# Patient Record
Sex: FEMALE | Race: ASIAN | NOT HISPANIC OR LATINO
[De-identification: names, ages, dates, MRNs, and addresses within clinical notes are randomized per-mention and may not be internally consistent; named-entity substitution may affect disease eponyms.]

---

## 2017-04-03 ENCOUNTER — APPOINTMENT (OUTPATIENT)
Dept: OBGYN | Facility: CLINIC | Age: 52
End: 2017-04-03

## 2017-04-03 VITALS
SYSTOLIC BLOOD PRESSURE: 140 MMHG | DIASTOLIC BLOOD PRESSURE: 74 MMHG | BODY MASS INDEX: 31.41 KG/M2 | HEIGHT: 60 IN | WEIGHT: 160 LBS

## 2017-04-03 DIAGNOSIS — R00.2 PALPITATIONS: ICD-10-CM

## 2017-04-03 RX ORDER — METOPROLOL TARTRATE 25 MG/1
25 TABLET, FILM COATED ORAL
Refills: 0 | Status: ACTIVE | COMMUNITY

## 2017-04-07 LAB
C TRACH RRNA SPEC QL NAA+PROBE: NORMAL
N GONORRHOEA RRNA SPEC QL NAA+PROBE: NORMAL
SOURCE AMPLIFICATION: NORMAL

## 2018-04-16 ENCOUNTER — APPOINTMENT (OUTPATIENT)
Dept: OBGYN | Facility: CLINIC | Age: 53
End: 2018-04-16
Payer: COMMERCIAL

## 2018-04-16 VITALS
DIASTOLIC BLOOD PRESSURE: 70 MMHG | WEIGHT: 162 LBS | SYSTOLIC BLOOD PRESSURE: 130 MMHG | BODY MASS INDEX: 31.8 KG/M2 | HEIGHT: 60 IN

## 2018-04-16 DIAGNOSIS — L90.0 LICHEN SCLEROSUS ET ATROPHICUS: ICD-10-CM

## 2018-04-16 DIAGNOSIS — Z00.00 ENCOUNTER FOR GENERAL ADULT MEDICAL EXAMINATION W/OUT ABNORMAL FINDINGS: ICD-10-CM

## 2018-04-16 PROCEDURE — 99396 PREV VISIT EST AGE 40-64: CPT

## 2018-04-18 LAB — HPV HIGH+LOW RISK DNA PNL CVX: NOT DETECTED

## 2018-04-21 LAB — CYTOLOGY CVX/VAG DOC THIN PREP: NORMAL

## 2019-04-21 PROBLEM — Z12.11 COLON CANCER SCREENING: Status: ACTIVE | Noted: 2017-04-03

## 2019-04-22 ENCOUNTER — APPOINTMENT (OUTPATIENT)
Dept: OBGYN | Facility: CLINIC | Age: 54
End: 2019-04-22
Payer: COMMERCIAL

## 2019-04-22 DIAGNOSIS — Z12.11 ENCOUNTER FOR SCREENING FOR MALIGNANT NEOPLASM OF COLON: ICD-10-CM

## 2019-04-26 ENCOUNTER — APPOINTMENT (OUTPATIENT)
Dept: OBGYN | Facility: CLINIC | Age: 54
End: 2019-04-26
Payer: COMMERCIAL

## 2019-04-26 VITALS
WEIGHT: 160 LBS | SYSTOLIC BLOOD PRESSURE: 160 MMHG | HEIGHT: 60 IN | DIASTOLIC BLOOD PRESSURE: 100 MMHG | BODY MASS INDEX: 31.41 KG/M2

## 2019-04-26 DIAGNOSIS — D25.9 LEIOMYOMA OF UTERUS, UNSPECIFIED: ICD-10-CM

## 2019-04-26 DIAGNOSIS — Z01.411 ENCOUNTER FOR GYNECOLOGICAL EXAMINATION (GENERAL) (ROUTINE) WITH ABNORMAL FINDINGS: ICD-10-CM

## 2019-04-26 PROCEDURE — 99396 PREV VISIT EST AGE 40-64: CPT

## 2019-04-26 NOTE — HISTORY OF PRESENT ILLNESS
[de-identified] : April 2018 [Continuous] : no continuous [Dull] : no dull [Sharp] : no sharp [Throbbing] : no throbbing [Stabbing] : no stabbing [Burning] : no burning [Mass] : no mass [Itching] : no itching [Lesion] : no lesion [Stinging] : no stinging [Soreness] : no soreness [Discharge] : no discharge [FreeTextEntry9] : Patient denies a manner [Localized Pain] : no localized pain [Mass (___cm)] : no palpable mass [Diffused Pain] : no diffused pain [Nipple Discharge] : no nipple discharge [Skin Color Change] : no skin color change [Hot Flashes] : no hot flashes [Night Sweats] : no night sweats [Vaginal Itching] : no vaginal itching [Dyspareunia] : no dyspareunia [FreeTextEntry8] : No menopausal symptoms reported [Mood Changes] : no mood changes [Spotting Between  Menses] : no spotting between menses [Menstrual Cramps] : no menstrual cramps [Currently In Menopause] : not currently in menopause [Sexually Active] : is not sexually active [Experiencing Menopausal Sxs] : not experiencing menopausal symptoms [Contraception] : does not use contraception [de-identified] : current partner since 1995 - satish active anymore

## 2019-04-26 NOTE — PHYSICAL EXAM
[Awake] : awake [Alert] : alert [Soft] : soft [Oriented x3] : oriented to person, place, and time [Labia Majora] : labia major [Labia Minora] : labia minora [No Bleeding] : there was no active vaginal bleeding [Normal] : clitoris [Enlarged ___ wks] : enlarged [unfilled] ~Uweeks [Anteversion] : anteverted [Uterine Adnexae] : were not tender and not enlarged [No Tenderness] : no rectal tenderness [Acute Distress] : no acute distress [Goiter] : no goiter [Thyroid Nodule] : no thyroid nodule [LAD] : no lymphadenopathy [Mass] : no breast mass [Axillary LAD] : no axillary lymphadenopathy [Nipple Discharge] : no nipple discharge [Tender] : non tender [Distended] : not distended [Flat Affect] : affect not flat [H/Smegaly] : no hepatosplenomegaly [Depressed Mood] : not depressed [Discharge] : had no discharge [IUD String] : did not have an IUD string protruding out [Pap Obtained] : a Pap smear was not performed [Tenderness] : nontender [Mass ___ cm] : no uterine mass was palpated [Motion Tenderness] : there was no cervical motion tenderness [de-identified] : Bilateral erythema of the labia majora - parchment like texture of the skin - consistent with lichen [FreeTextEntry5] : Ectropion [FreeTextEntry7] : Several fibroid nodules measuring approximately 3 cm palpable

## 2019-04-26 NOTE — CHIEF COMPLAINT
[FreeTextEntry1] : \par \par This patient was last seen in April 2018\par Issues discussed\par \par \par Annual examination\par Pap smear and HPV negative April 2018\par Gonorrhea and Chlamydia negative March 2016 -  she is not sexually active\par Mammogram and breast ultrasound BI-RADS category 2 April 2018\par Patient to make scheduled appointment for colonoscopy this year\par

## 2020-07-09 ENCOUNTER — APPOINTMENT (OUTPATIENT)
Dept: OBGYN | Facility: CLINIC | Age: 55
End: 2020-07-09
Payer: COMMERCIAL

## 2020-07-09 VITALS
BODY MASS INDEX: 31.61 KG/M2 | HEIGHT: 60 IN | SYSTOLIC BLOOD PRESSURE: 112 MMHG | DIASTOLIC BLOOD PRESSURE: 74 MMHG | WEIGHT: 161 LBS

## 2020-07-09 DIAGNOSIS — R39.11 HESITANCY OF MICTURITION: ICD-10-CM

## 2020-07-09 DIAGNOSIS — N95.1 MENOPAUSAL AND FEMALE CLIMACTERIC STATES: ICD-10-CM

## 2020-07-09 DIAGNOSIS — Z01.419 ENCOUNTER FOR GYNECOLOGICAL EXAMINATION (GENERAL) (ROUTINE) W/OUT ABNORMAL FINDINGS: ICD-10-CM

## 2020-07-09 PROCEDURE — 99396 PREV VISIT EST AGE 40-64: CPT

## 2020-07-09 NOTE — PHYSICAL EXAM
[Awake] : awake [Alert] : alert [Acute Distress] : no acute distress [Mass] : no breast mass [Axillary LAD] : no axillary lymphadenopathy [Nipple Discharge] : no nipple discharge [Soft] : soft [Tender] : non tender [Oriented x3] : oriented to person, place, and time [No Bleeding] : there was no active vaginal bleeding [Normal] : uterus [Uterine Adnexae] : were not tender and not enlarged [FreeTextEntry4] : stage 1 AVWP and apical prolapse, good posterior support

## 2021-08-02 ENCOUNTER — APPOINTMENT (OUTPATIENT)
Dept: OBGYN | Facility: CLINIC | Age: 56
End: 2021-08-02
Payer: COMMERCIAL

## 2021-08-02 ENCOUNTER — NON-APPOINTMENT (OUTPATIENT)
Age: 56
End: 2021-08-02

## 2021-08-02 VITALS
WEIGHT: 154.38 LBS | HEIGHT: 60 IN | SYSTOLIC BLOOD PRESSURE: 170 MMHG | DIASTOLIC BLOOD PRESSURE: 90 MMHG | BODY MASS INDEX: 30.31 KG/M2

## 2021-08-02 DIAGNOSIS — R92.2 INCONCLUSIVE MAMMOGRAM: ICD-10-CM

## 2021-08-02 DIAGNOSIS — Z01.419 ENCOUNTER FOR GYNECOLOGICAL EXAMINATION (GENERAL) (ROUTINE) W/OUT ABNORMAL FINDINGS: ICD-10-CM

## 2021-08-02 DIAGNOSIS — F41.9 ANXIETY DISORDER, UNSPECIFIED: ICD-10-CM

## 2021-08-02 PROCEDURE — 99396 PREV VISIT EST AGE 40-64: CPT

## 2021-08-03 LAB — HPV HIGH+LOW RISK DNA PNL CVX: NOT DETECTED

## 2021-08-06 LAB — CYTOLOGY CVX/VAG DOC THIN PREP: NORMAL

## 2022-08-22 ENCOUNTER — APPOINTMENT (OUTPATIENT)
Dept: OBGYN | Facility: CLINIC | Age: 57
End: 2022-08-22

## 2024-07-31 ENCOUNTER — APPOINTMENT (OUTPATIENT)
Dept: OBGYN | Facility: CLINIC | Age: 59
End: 2024-07-31
Payer: COMMERCIAL

## 2024-07-31 VITALS
SYSTOLIC BLOOD PRESSURE: 173 MMHG | HEIGHT: 62 IN | DIASTOLIC BLOOD PRESSURE: 93 MMHG | BODY MASS INDEX: 28.34 KG/M2 | WEIGHT: 154 LBS

## 2024-07-31 DIAGNOSIS — N81.10 CYSTOCELE, UNSPECIFIED: ICD-10-CM

## 2024-07-31 DIAGNOSIS — R10.2 PELVIC AND PERINEAL PAIN: ICD-10-CM

## 2024-07-31 PROCEDURE — 99213 OFFICE O/P EST LOW 20 MIN: CPT

## 2024-07-31 NOTE — PLAN
[FreeTextEntry1] : POP noted on exam with cervix reaching introitus with valsalva. Advised urogyn and pfpt.

## 2024-07-31 NOTE — HISTORY OF PRESENT ILLNESS
[FreeTextEntry1] : LMP  59yo P1 here for, last seen . Saw Dr. Vu in the meantime. Pt reports feeling some pelvic pressure and some sensation of incomplete voids, also notes that when she feels her vagina can feel bladder or uterus. Went through menopause at age 57, has had 1 full year of amenorrhea. Mother with breast cancer in 60s  HCM - pap  normal per report - mammo/sono 10/2023 normal - colonoscopy 2018  FT   7#13 Hx fibroids

## 2024-07-31 NOTE — PHYSICAL EXAM
[Labia Majora] : normal [Labia Minora] : normal [Normal] : normal [Uterine Adnexae] : normal [Cystocele] : a cystocele [FreeTextEntry4] : stage 2 apical and AVWP

## 2024-09-23 ENCOUNTER — APPOINTMENT (OUTPATIENT)
Dept: UROGYNECOLOGY | Facility: CLINIC | Age: 59
End: 2024-09-23
Payer: COMMERCIAL

## 2024-09-23 VITALS
SYSTOLIC BLOOD PRESSURE: 182 MMHG | DIASTOLIC BLOOD PRESSURE: 98 MMHG | HEIGHT: 62 IN | WEIGHT: 154 LBS | BODY MASS INDEX: 28.34 KG/M2 | HEART RATE: 66 BPM

## 2024-09-23 DIAGNOSIS — E78.00 PURE HYPERCHOLESTEROLEMIA, UNSPECIFIED: ICD-10-CM

## 2024-09-23 DIAGNOSIS — N39.3 STRESS INCONTINENCE (FEMALE) (MALE): ICD-10-CM

## 2024-09-23 DIAGNOSIS — F41.0 PANIC DISORDER [EPISODIC PAROXYSMAL ANXIETY]: ICD-10-CM

## 2024-09-23 DIAGNOSIS — N81.2 INCOMPLETE UTEROVAGINAL PROLAPSE: ICD-10-CM

## 2024-09-23 DIAGNOSIS — J45.909 UNSPECIFIED ASTHMA, UNCOMPLICATED: ICD-10-CM

## 2024-09-23 LAB
BILIRUB UR QL STRIP: NORMAL
CLARITY UR: CLEAR
COLLECTION METHOD: NORMAL
GLUCOSE UR-MCNC: NORMAL
HCG UR QL: 0.2 EU/DL
HGB UR QL STRIP.AUTO: NORMAL
KETONES UR-MCNC: NORMAL
LEUKOCYTE ESTERASE UR QL STRIP: NORMAL
NITRITE UR QL STRIP: NORMAL
PH UR STRIP: 5.5
PROT UR STRIP-MCNC: NORMAL
SP GR UR STRIP: 1.01

## 2024-09-23 PROCEDURE — 51701 INSERT BLADDER CATHETER: CPT

## 2024-09-23 PROCEDURE — 99244 OFF/OP CNSLTJ NEW/EST MOD 40: CPT | Mod: 25

## 2024-09-23 PROCEDURE — 99459 PELVIC EXAMINATION: CPT

## 2024-09-23 RX ORDER — PITAVASTATIN CALCIUM 2 MG/1
2 TABLET ORAL
Refills: 0 | Status: ACTIVE | COMMUNITY

## 2024-09-23 RX ORDER — LATANOPROST/PF 0.005 %
0.01 DROPS OPHTHALMIC (EYE)
Refills: 0 | Status: ACTIVE | COMMUNITY

## 2024-09-23 NOTE — HISTORY OF PRESENT ILLNESS
[Cystocele (Obstetric)] : severe [Uterine Prolapse] : mild [Vaginal Wall Prolapse] : no [Rectal Prolapse] : moderate [Unable To Restrain Bowel Movement] : no [Urinary Frequency] : no [Feelings Of Urinary Urgency] : no [Urinary Frequency More Than Twice At Night (Nocturia)] : no nocturia [Urinary Tract Infection] : no [Constipation Obstructed Defecation] : no [Incomplete Emptying Of Stool] : no [de-identified] : daily pressure [FreeTextEntry3] : splints when in shower because she sees prolapse; not for urination or BMs [de-identified] : she had VANESSA previously that spontaneously improved [de-identified] : 6-8 [de-identified] : often [de-identified] : not SA [FreeTextEntry1] : Melissa is a 58 yo who presents for initial visit for 'stage 3 cystocele' diagnosed by gynecologist. She is bothered by vaginal pressure and 'heaviness'. She has not tried any treatment for this in the past.   PMH: HLD PSH: none OBGYN: P1, denies PMB, abnormal paps. UTD on mammogram FamHx: mom with breast ca Daily fluid intake: coffee 1c, tea 1c, water 6-8 glasses, kombucha now and then

## 2024-09-23 NOTE — DISCUSSION/SUMMARY
[FreeTextEntry1] :  We reviewed management options for her prolapse including: observation, pelvic floor exercises with and without PT, pessary, and surgical management. We reviewed various surgical management options including vaginal, laparoscopic/robotic, abdominal procedures. We reviewed procedures involving hysterectomy as well as uterine sparing procedures. We also reviewed both mesh and non-mesh options. We reviewed risks of each procedure including bleeding, infection, injury to surrounding organs as well as potential mesh complications. Written IUGA information on prolapse treatment options was also provided to her to review. She is interested in surgery with a RADHA SUAZO and would like to proceed with surgical scheduling.  We will procced with preop workup including pelvic sonogram and URD testing. IUGA info on URD provided.

## 2024-09-23 NOTE — PHYSICAL EXAM
[Chaperone Present] : A chaperone was present in the examining room during all aspects of the physical examination [[id number]] : A chaperone was present during the pelvic exam. [Labia Majora] : were normal [Labia Minora] : were normal [Normal Appearance] : general appearance was normal [Uterine Adnexae] : were not tender and not enlarged [Normal] : no abnormalities [Normal rectal exam] : was normal [Aa ____] : Aa [unfilled] [Ba ____] : Ba [unfilled] [C ____] : C [unfilled] [GH ____] : GH [unfilled] [PB ____] : PB [unfilled] [TVL ____] : TVL  [unfilled] [Ap ____] : Ap [unfilled] [Bp ____] : Bp [unfilled] [D ____] : D [unfilled] [FreeTextEntry2] : MD Cecily [FreeTextEntry1] : General: Well appearing. Alert and oriented. No acute distress. HEENT: Normocephalic, atraumatic, extraocular muscles appear to be intact. Neck: Full range of motion, no obvious lymphadenopathy, deformities, or masses noted. Respiratory: Speaking in full sentences comfortably. Normal work of breathing. No cough during visit. Musculoskeletal: Active full range of motion in extremities. Skin: No obvious rash or skin lesions. Neuro: Oriented x3. Speech is fluent, normal rate. Psych: Normal mood and affect. [Tenderness] : ~T no ~M abdominal tenderness observed [Distended] : not distended

## 2024-09-24 ENCOUNTER — NON-APPOINTMENT (OUTPATIENT)
Age: 59
End: 2024-09-24

## 2024-10-24 ENCOUNTER — APPOINTMENT (OUTPATIENT)
Dept: UROGYNECOLOGY | Facility: CLINIC | Age: 59
End: 2024-10-24

## 2024-11-01 ENCOUNTER — APPOINTMENT (OUTPATIENT)
Dept: OBGYN | Facility: CLINIC | Age: 59
End: 2024-11-01
Payer: COMMERCIAL

## 2024-11-01 VITALS
HEIGHT: 62 IN | SYSTOLIC BLOOD PRESSURE: 175 MMHG | WEIGHT: 155 LBS | BODY MASS INDEX: 28.52 KG/M2 | DIASTOLIC BLOOD PRESSURE: 77 MMHG

## 2024-11-01 DIAGNOSIS — Z01.419 ENCOUNTER FOR GYNECOLOGICAL EXAMINATION (GENERAL) (ROUTINE) W/OUT ABNORMAL FINDINGS: ICD-10-CM

## 2024-11-01 DIAGNOSIS — N95.1 MENOPAUSAL AND FEMALE CLIMACTERIC STATES: ICD-10-CM

## 2024-11-01 PROCEDURE — G0444 DEPRESSION SCREEN ANNUAL: CPT | Mod: 59

## 2024-11-01 PROCEDURE — 99396 PREV VISIT EST AGE 40-64: CPT

## 2024-11-01 PROCEDURE — 99214 OFFICE O/P EST MOD 30 MIN: CPT | Mod: 25

## 2024-11-01 RX ORDER — GABAPENTIN 300 MG/1
300 CAPSULE ORAL
Qty: 30 | Refills: 2 | Status: ACTIVE | COMMUNITY
Start: 2024-11-01 | End: 1900-01-01

## 2024-11-04 LAB — HPV HIGH+LOW RISK DNA PNL CVX: NOT DETECTED

## 2024-11-06 LAB — CYTOLOGY CVX/VAG DOC THIN PREP: NORMAL

## 2024-11-15 ENCOUNTER — APPOINTMENT (OUTPATIENT)
Dept: UROGYNECOLOGY | Facility: CLINIC | Age: 59
End: 2024-11-15

## 2024-11-20 ENCOUNTER — APPOINTMENT (OUTPATIENT)
Dept: UROGYNECOLOGY | Facility: CLINIC | Age: 59
End: 2024-11-20

## 2024-11-26 ENCOUNTER — APPOINTMENT (OUTPATIENT)
Dept: UROGYNECOLOGY | Facility: HOSPITAL | Age: 59
End: 2024-11-26